# Patient Record
Sex: MALE | Race: ASIAN | NOT HISPANIC OR LATINO
[De-identification: names, ages, dates, MRNs, and addresses within clinical notes are randomized per-mention and may not be internally consistent; named-entity substitution may affect disease eponyms.]

---

## 2024-05-10 PROBLEM — Z00.00 ENCOUNTER FOR PREVENTIVE HEALTH EXAMINATION: Status: ACTIVE | Noted: 2024-05-10

## 2024-05-13 ENCOUNTER — APPOINTMENT (OUTPATIENT)
Dept: ORTHOPEDIC SURGERY | Facility: CLINIC | Age: 54
End: 2024-05-13

## 2024-05-17 ENCOUNTER — APPOINTMENT (OUTPATIENT)
Dept: ORTHOPEDIC SURGERY | Facility: CLINIC | Age: 54
End: 2024-05-17
Payer: COMMERCIAL

## 2024-05-17 DIAGNOSIS — M25.572 PAIN IN LEFT ANKLE AND JOINTS OF LEFT FOOT: ICD-10-CM

## 2024-05-17 DIAGNOSIS — G89.29 PAIN IN LEFT ANKLE AND JOINTS OF LEFT FOOT: ICD-10-CM

## 2024-05-17 DIAGNOSIS — M25.562 PAIN IN LEFT KNEE: ICD-10-CM

## 2024-05-17 PROCEDURE — 73562 X-RAY EXAM OF KNEE 3: CPT | Mod: 50

## 2024-05-17 PROCEDURE — 99203 OFFICE O/P NEW LOW 30 MIN: CPT

## 2024-05-17 PROCEDURE — 73610 X-RAY EXAM OF ANKLE: CPT | Mod: LT

## 2024-05-17 NOTE — REASON FOR VISIT
[Initial Visit] : an initial visit for [Knee Pain] : knee pain [Other: ____] : [unfilled] [FreeTextEntry2] : left knee pain started in on april 17th and left ankle pain started october 2023. He was pl;aying soccer both times when the knee and ankle started to hurt. he states the ankle s=does not hurt now it is just still "puffy". the knee does hurt but mostly while utilizing stairs.

## 2024-05-17 NOTE — PHYSICAL EXAM
[de-identified] : Fullness ofLeft knee exam today patient has definite medial joint line tenderness positive Nassar sign.  Quad tone is good range of motion 0 to 135 degrees knee stable to stress varus valgus rest both full extension and 90 degrees of flexion minimal soft tissue swelling minimal warmth no effusion noted.  Left ankle has increased AP laxity on exam today.  There is some mild tenderness and vague at the ankle.  Some mild warmth also noted.  Neurovascular intact distally. [de-identified] : Knee radiographs were ordered today AP standing individual lateral sunrise views obtained showing low preserved joint space in all 3 compartments of the both knees with no evidence of any recent accident injury or acute bony trauma no evidence of arthritis.  3 views of the left ankle were obtained showing no evidence of bony injury no evidence of OCD with particular attention paid to the talar dome.  No evidence to suggest any recent fibular fracture.

## 2024-05-17 NOTE — DISCUSSION/SUMMARY
[de-identified] : Patient I talked at length about the potential underlying etiology of his left medial knee pain.  Patient's clinical exam history and radiographs all suggestive of possible meniscal pathology.  Due to the fact has been having pain for greater than 3 months she will be sent for an MRI to help evaluate for meniscal pathology.  Utilizing a model as well as a drawing we are able to review the pertinent anatomy of the knee joint patient was able to appreciate how torn medial meniscus can cause symptoms very similar to his complaints today.  As far as the left ankle is concerned he has a grade 2 2/grade 3 AP instability.  I indicated to the patient he also has some soft tissue swelling and warmth about the left ankle.  Believe this all could be attributed to possible attenuation of the ATFL.  Due to the fact that this is ongoing for greater than 6 months an MRI of the left ankle will also be ordered.  Will notify the patient once the results of the images of both the ankle and knee are available for review.  This consultation lasted 40 minutes.

## 2024-05-17 NOTE — HISTORY OF PRESENT ILLNESS
[de-identified] : First-time visit of the otherwise healthy 53-year-old gentleman he is here with 2 complaints that his left knee intermittent stabbing medial sided knee pain.  Is ongoing for approximately 3 months.  Patient is fairly active  does not recall any specific accident injury or initiating traumatic event.  The other issues at left ankle pain and swelling which began after a injury playing soccer in October 2023 for the last 7 months patient has noticed increased swelling and warmth about the ankle and the intermittent sense of instability.  Patient is here for first-time consultation.  He currently is taking no medications.

## 2024-05-31 RX ORDER — CELECOXIB 200 MG/1
200 CAPSULE ORAL
Qty: 30 | Refills: 0 | Status: ACTIVE | COMMUNITY
Start: 2024-05-31 | End: 1900-01-01